# Patient Record
Sex: MALE | Race: WHITE | Employment: OTHER | URBAN - METROPOLITAN AREA
[De-identification: names, ages, dates, MRNs, and addresses within clinical notes are randomized per-mention and may not be internally consistent; named-entity substitution may affect disease eponyms.]

---

## 2019-02-22 ENCOUNTER — APPOINTMENT (OUTPATIENT)
Dept: GENERAL RADIOLOGY | Age: 69
End: 2019-02-22
Attending: NURSE PRACTITIONER
Payer: MEDICARE

## 2019-02-22 ENCOUNTER — HOSPITAL ENCOUNTER (EMERGENCY)
Age: 69
Discharge: HOME | End: 2019-02-22
Attending: EMERGENCY MEDICINE
Payer: MEDICARE

## 2019-02-22 ENCOUNTER — APPOINTMENT (OUTPATIENT)
Dept: CT IMAGING | Age: 69
End: 2019-02-22
Attending: NURSE PRACTITIONER
Payer: MEDICARE

## 2019-02-22 VITALS
RESPIRATION RATE: 18 BRPM | DIASTOLIC BLOOD PRESSURE: 89 MMHG | WEIGHT: 200 LBS | HEART RATE: 86 BPM | SYSTOLIC BLOOD PRESSURE: 174 MMHG | BODY MASS INDEX: 28.63 KG/M2 | TEMPERATURE: 98.6 F | OXYGEN SATURATION: 100 % | HEIGHT: 70 IN

## 2019-02-22 DIAGNOSIS — N20.0 KIDNEY STONE: Primary | ICD-10-CM

## 2019-02-22 DIAGNOSIS — E87.1 HYPONATREMIA: ICD-10-CM

## 2019-02-22 DIAGNOSIS — R93.89 ABNORMAL CT SCAN: ICD-10-CM

## 2019-02-22 DIAGNOSIS — S32.020A CLOSED COMPRESSION FRACTURE OF SECOND LUMBAR VERTEBRA, INITIAL ENCOUNTER: ICD-10-CM

## 2019-02-22 LAB
ALBUMIN SERPL-MCNC: 4.1 G/DL (ref 3.4–5)
ALBUMIN/GLOB SERPL: 1.1 {RATIO} (ref 0.8–1.7)
ALP SERPL-CCNC: 67 U/L (ref 45–117)
ALT SERPL-CCNC: 33 U/L (ref 16–61)
ANION GAP SERPL CALC-SCNC: 7 MMOL/L (ref 3–18)
APPEARANCE UR: CLEAR
AST SERPL-CCNC: 25 U/L (ref 15–37)
BACTERIA URNS QL MICRO: NEGATIVE /HPF
BASOPHILS # BLD: 0 K/UL (ref 0–0.1)
BASOPHILS NFR BLD: 0 % (ref 0–2)
BILIRUB SERPL-MCNC: 1 MG/DL (ref 0.2–1)
BILIRUB UR QL: NEGATIVE
BUN SERPL-MCNC: 21 MG/DL (ref 7–18)
BUN/CREAT SERPL: 18 (ref 12–20)
CALCIUM SERPL-MCNC: 9.1 MG/DL (ref 8.5–10.1)
CHLORIDE SERPL-SCNC: 99 MMOL/L (ref 100–108)
CO2 SERPL-SCNC: 29 MMOL/L (ref 21–32)
COLOR UR: YELLOW
CREAT SERPL-MCNC: 1.16 MG/DL (ref 0.6–1.3)
DIFFERENTIAL METHOD BLD: ABNORMAL
EOSINOPHIL # BLD: 0.1 K/UL (ref 0–0.4)
EOSINOPHIL NFR BLD: 1 % (ref 0–5)
EPITH CASTS URNS QL MICRO: NORMAL /LPF (ref 0–5)
ERYTHROCYTE [DISTWIDTH] IN BLOOD BY AUTOMATED COUNT: 13.7 % (ref 11.6–14.5)
GLOBULIN SER CALC-MCNC: 3.8 G/DL (ref 2–4)
GLUCOSE SERPL-MCNC: 114 MG/DL (ref 74–99)
GLUCOSE UR STRIP.AUTO-MCNC: NEGATIVE MG/DL
HCT VFR BLD AUTO: 45.4 % (ref 36–48)
HGB BLD-MCNC: 15.5 G/DL (ref 13–16)
HGB UR QL STRIP: ABNORMAL
KETONES UR QL STRIP.AUTO: ABNORMAL MG/DL
LEUKOCYTE ESTERASE UR QL STRIP.AUTO: NEGATIVE
LIPASE SERPL-CCNC: 196 U/L (ref 73–393)
LYMPHOCYTES # BLD: 1.8 K/UL (ref 0.9–3.6)
LYMPHOCYTES NFR BLD: 16 % (ref 21–52)
MCH RBC QN AUTO: 33.5 PG (ref 24–34)
MCHC RBC AUTO-ENTMCNC: 34.1 G/DL (ref 31–37)
MCV RBC AUTO: 98.3 FL (ref 74–97)
MONOCYTES # BLD: 1.1 K/UL (ref 0.05–1.2)
MONOCYTES NFR BLD: 10 % (ref 3–10)
NEUTS SEG # BLD: 8 K/UL (ref 1.8–8)
NEUTS SEG NFR BLD: 73 % (ref 40–73)
NITRITE UR QL STRIP.AUTO: NEGATIVE
PH UR STRIP: 6 [PH] (ref 5–8)
PLATELET # BLD AUTO: 164 K/UL (ref 135–420)
PMV BLD AUTO: 10.3 FL (ref 9.2–11.8)
POTASSIUM SERPL-SCNC: 3.8 MMOL/L (ref 3.5–5.5)
PROT SERPL-MCNC: 7.9 G/DL (ref 6.4–8.2)
PROT UR STRIP-MCNC: NEGATIVE MG/DL
RBC # BLD AUTO: 4.62 M/UL (ref 4.7–5.5)
RBC #/AREA URNS HPF: NORMAL /HPF (ref 0–5)
SODIUM SERPL-SCNC: 135 MMOL/L (ref 136–145)
SP GR UR REFRACTOMETRY: 1.02 (ref 1–1.03)
UROBILINOGEN UR QL STRIP.AUTO: 0.2 EU/DL (ref 0.2–1)
WBC # BLD AUTO: 11 K/UL (ref 4.6–13.2)
WBC URNS QL MICRO: NORMAL /HPF (ref 0–5)

## 2019-02-22 PROCEDURE — 81001 URINALYSIS AUTO W/SCOPE: CPT

## 2019-02-22 PROCEDURE — 85025 COMPLETE CBC W/AUTO DIFF WBC: CPT

## 2019-02-22 PROCEDURE — 96376 TX/PRO/DX INJ SAME DRUG ADON: CPT

## 2019-02-22 PROCEDURE — 99284 EMERGENCY DEPT VISIT MOD MDM: CPT

## 2019-02-22 PROCEDURE — 80053 COMPREHEN METABOLIC PANEL: CPT

## 2019-02-22 PROCEDURE — 74011250636 HC RX REV CODE- 250/636: Performed by: NURSE PRACTITIONER

## 2019-02-22 PROCEDURE — 74018 RADEX ABDOMEN 1 VIEW: CPT

## 2019-02-22 PROCEDURE — 87086 URINE CULTURE/COLONY COUNT: CPT

## 2019-02-22 PROCEDURE — 83690 ASSAY OF LIPASE: CPT

## 2019-02-22 PROCEDURE — 96361 HYDRATE IV INFUSION ADD-ON: CPT

## 2019-02-22 PROCEDURE — 74176 CT ABD & PELVIS W/O CONTRAST: CPT

## 2019-02-22 PROCEDURE — 96374 THER/PROPH/DIAG INJ IV PUSH: CPT

## 2019-02-22 RX ORDER — ONDANSETRON 4 MG/1
4 TABLET, FILM COATED ORAL
Qty: 10 TAB | Refills: 0 | Status: SHIPPED | OUTPATIENT
Start: 2019-02-22

## 2019-02-22 RX ORDER — MORPHINE SULFATE 4 MG/ML
4 INJECTION INTRAVENOUS
Status: COMPLETED | OUTPATIENT
Start: 2019-02-22 | End: 2019-02-22

## 2019-02-22 RX ORDER — HYDROCODONE BITARTRATE AND ACETAMINOPHEN 5; 325 MG/1; MG/1
1 TABLET ORAL
Qty: 10 TAB | Refills: 0 | Status: SHIPPED | OUTPATIENT
Start: 2019-02-22

## 2019-02-22 RX ORDER — MORPHINE SULFATE 2 MG/ML
4 INJECTION, SOLUTION INTRAMUSCULAR; INTRAVENOUS
Status: COMPLETED | OUTPATIENT
Start: 2019-02-22 | End: 2019-02-22

## 2019-02-22 RX ORDER — SODIUM CHLORIDE 9 MG/ML
1000 INJECTION, SOLUTION INTRAVENOUS ONCE
Status: COMPLETED | OUTPATIENT
Start: 2019-02-22 | End: 2019-02-22

## 2019-02-22 RX ORDER — TAMSULOSIN HYDROCHLORIDE 0.4 MG/1
0.4 CAPSULE ORAL DAILY
Qty: 15 CAP | Refills: 0 | Status: SHIPPED | OUTPATIENT
Start: 2019-02-22 | End: 2019-03-09

## 2019-02-22 RX ORDER — NABUMETONE 500 MG/1
TABLET, FILM COATED ORAL 2 TIMES DAILY
COMMUNITY

## 2019-02-22 RX ORDER — METHOTREXATE 25 MG/ML
INJECTION INTRA-ARTERIAL; INTRAMUSCULAR; INTRATHECAL; INTRAVENOUS ONCE
COMMUNITY

## 2019-02-22 RX ADMIN — MORPHINE SULFATE 4 MG: 4 INJECTION INTRAVENOUS at 22:30

## 2019-02-22 RX ADMIN — SODIUM CHLORIDE 1000 ML: 900 INJECTION, SOLUTION INTRAVENOUS at 19:08

## 2019-02-22 RX ADMIN — MORPHINE SULFATE 4 MG: 2 INJECTION, SOLUTION INTRAMUSCULAR; INTRAVENOUS at 19:13

## 2019-02-22 NOTE — ED PROVIDER NOTES
EMERGENCY DEPARTMENT HISTORY AND PHYSICAL EXAM 
 
Date: 2/22/2019 Patient Name: Noemy Lyman History of Presenting Illness Chief Complaint Patient presents with  Flank Pain History Provided By: Patient Chief Complaint: Flank pain Duration: 3 Days Timing:  Acute and Waxing and Waning Location: Left flank Modifying Factors: Pt was given Tylenol with Codeine with some relief Associated Symptoms: nausea, chills and constipation Additional History (Context):  
7:02 PM 
Renee Napier III is a 68 y.o. male with PMHX of Kidney Stones, HTN, Celiac Disease, RA, Prostate CA and PSHx Lithotripsy who presents to the emergency department C/O waxing and waning left sided flank pain onset 3 days ago, that resolved 2 days ago and started again yesterday. Pt had a Tylenol with Codeine at home with some relief. Associated sxs include nausea (resolved), chills, constipation. Pt's wife states that they are trying to get a new urologist, and they called Dr. Billy Murphy today who said to come to the ED. Pt denies PMHx heart failure, fever, diarrhea and any other sxs or complaints. PCP: Estephania Muñoz, DO 
 
Current Outpatient Medications Medication Sig Dispense Refill  methotrexate, PF, 25 mg/mL injection once.  nabumetone (RELAFEN) 500 mg tablet Take  by mouth two (2) times a day.  golimumab (SIMPONI ARIA) 12.5 mg/mL soln solution by IntraVENous route.  tamsulosin (FLOMAX) 0.4 mg capsule Take 1 Cap by mouth daily for 15 days. 15 Cap 0  
 HYDROcodone-acetaminophen (NORCO) 5-325 mg per tablet Take 1 Tab by mouth every four (4) hours as needed for Pain. Max Daily Amount: 6 Tabs. 10 Tab 0  
 ondansetron hcl (ZOFRAN) 4 mg tablet Take 1 Tab by mouth every eight (8) hours as needed for Nausea. 10 Tab 0  
 OTHER 1 Bag by IntraVENous route every two (2) months. Indications: Symponi aria infusion rheumatoid arthritis  OTHER Take 10 mg by mouth daily. Indications: leslunomide to augment the symponi  cholecalciferol, vitamin D3, 2,000 unit tab Take 1 Tab by mouth two (2) times a day.  OTHER Take 100 mg by mouth two (2) times a day. Indications: tumeric  omega-3 fatty acids-vitamin e 1,000 mg cap Take 1 Cap by mouth three (3) times daily. Past History Past Medical History: 
Past Medical History:  
Diagnosis Date  Celiac disease  Hypertension  Kidney stones  Prostate CA ([facility] [address]  RA (rheumatoid arthritis) ([facility] [address] Past Surgical History: 
Past Surgical History:  
Procedure Laterality Date  HX APPENDECTOMY  HX CHOLECYSTECTOMY  HX LITHOTRIPSY  HX PROSTATECTOMY  HX TONSILLECTOMY Family History: 
History reviewed. No pertinent family history. Social History: 
Social History Tobacco Use  Smoking status: Never Smoker  Smokeless tobacco: Never Used Substance Use Topics  Alcohol use: Yes  Drug use: Not on file Allergies: Allergies Allergen Reactions  Gluten Hives Review of Systems Review of Systems Constitutional: Positive for chills. Negative for fever. Gastrointestinal: Positive for constipation and nausea. Negative for diarrhea and vomiting. Genitourinary: Positive for flank pain (left). All other systems reviewed and are negative. Physical Exam  
 
Vitals:  
 02/22/19 1707 02/22/19 1913 02/22/19 2142 02/22/19 2233 BP:  172/81 159/76 174/89 Pulse: 86 85 83 86 Resp: 20 16 14 18 Temp: 98.6 °F (37 °C) SpO2: 100% 99% 100% 100% Weight: 90.7 kg (200 lb) Height: 5' 10\" (1.778 m) Physical Exam  
Constitutional: He is oriented to person, place, and time. He appears well-developed and well-nourished. Non-toxic, NAD HENT:  
Head: Normocephalic and atraumatic. Eyes: Conjunctivae are normal.  
Neck: Normal range of motion. Neck supple. Cardiovascular: Normal rate and regular rhythm. Pulmonary/Chest: Effort normal and breath sounds normal.  
Abdominal: Soft. Bowel sounds are normal. He exhibits no distension. There is tenderness. There is no rebound. positive left flank TTP, NO CVA TTP, abd TTP Musculoskeletal: Normal range of motion. Neurological: He is alert and oriented to person, place, and time. Skin: Skin is warm and dry. Nursing note and vitals reviewed. Diagnostic Study Results Labs - Recent Results (from the past 12 hour(s)) URINALYSIS W/ RFLX MICROSCOPIC Collection Time: 02/22/19  5:14 PM  
Result Value Ref Range Color YELLOW Appearance CLEAR Specific gravity 1.017 1.005 - 1.030    
 pH (UA) 6.0 5.0 - 8.0 Protein NEGATIVE  NEG mg/dL Glucose NEGATIVE  NEG mg/dL Ketone TRACE (A) NEG mg/dL Bilirubin NEGATIVE  NEG Blood TRACE (A) NEG Urobilinogen 0.2 0.2 - 1.0 EU/dL Nitrites NEGATIVE  NEG Leukocyte Esterase NEGATIVE  NEG    
URINE MICROSCOPIC ONLY Collection Time: 02/22/19  5:14 PM  
Result Value Ref Range WBC 0 to 1 0 - 5 /hpf  
 RBC 0 to 3 0 - 5 /hpf Epithelial cells FEW 0 - 5 /lpf Bacteria NEGATIVE  NEG /hpf  
CBC WITH AUTOMATED DIFF Collection Time: 02/22/19  6:58 PM  
Result Value Ref Range WBC 11.0 4.6 - 13.2 K/uL  
 RBC 4.62 (L) 4.70 - 5.50 M/uL  
 HGB 15.5 13.0 - 16.0 g/dL HCT 45.4 36.0 - 48.0 % MCV 98.3 (H) 74.0 - 97.0 FL  
 MCH 33.5 24.0 - 34.0 PG  
 MCHC 34.1 31.0 - 37.0 g/dL  
 RDW 13.7 11.6 - 14.5 % PLATELET 366 008 - 036 K/uL MPV 10.3 9.2 - 11.8 FL  
 NEUTROPHILS 73 40 - 73 % LYMPHOCYTES 16 (L) 21 - 52 % MONOCYTES 10 3 - 10 % EOSINOPHILS 1 0 - 5 % BASOPHILS 0 0 - 2 %  
 ABS. NEUTROPHILS 8.0 1.8 - 8.0 K/UL  
 ABS. LYMPHOCYTES 1.8 0.9 - 3.6 K/UL  
 ABS. MONOCYTES 1.1 0.05 - 1.2 K/UL  
 ABS. EOSINOPHILS 0.1 0.0 - 0.4 K/UL  
 ABS. BASOPHILS 0.0 0.0 - 0.1 K/UL  
 DF AUTOMATED METABOLIC PANEL, COMPREHENSIVE  Collection Time: 02/22/19  6:58 PM  
 Result Value Ref Range Sodium 135 (L) 136 - 145 mmol/L Potassium 3.8 3.5 - 5.5 mmol/L Chloride 99 (L) 100 - 108 mmol/L  
 CO2 29 21 - 32 mmol/L Anion gap 7 3.0 - 18 mmol/L Glucose 114 (H) 74 - 99 mg/dL BUN 21 (H) 7.0 - 18 MG/DL Creatinine 1.16 0.6 - 1.3 MG/DL  
 BUN/Creatinine ratio 18 12 - 20 GFR est AA >60 >60 ml/min/1.73m2 GFR est non-AA >60 >60 ml/min/1.73m2 Calcium 9.1 8.5 - 10.1 MG/DL Bilirubin, total 1.0 0.2 - 1.0 MG/DL  
 ALT (SGPT) 33 16 - 61 U/L  
 AST (SGOT) 25 15 - 37 U/L Alk. phosphatase 67 45 - 117 U/L Protein, total 7.9 6.4 - 8.2 g/dL Albumin 4.1 3.4 - 5.0 g/dL Globulin 3.8 2.0 - 4.0 g/dL A-G Ratio 1.1 0.8 - 1.7 LIPASE Collection Time: 02/22/19  6:58 PM  
Result Value Ref Range Lipase 196 73 - 393 U/L Radiologic Studies -  
10:58 PM 
RADIOLOGY FINDINGS Abd KUB X-ray shows no acute process and no kidney stone seen Pending review by Radiologist 
Recorded by Fabien Perales ED Scribe, as dictated by Cassius Rubin [facility] 
 
CT ABD PELV WO CONT Final Result IMPRESSION:  
  
Mild left hydronephrosis and hydroureter secondary to a 4 mm obstructing  
calculus in the left distal ureter. There are other nonobstructing renal calculi bilaterally. Prostatectomy Abnormal appearance to the L2 vertebrae concerning for metastasis bone scan  
advised for further evaluation. XR ABD (KUB)    (Results Pending) CT Results  (Last 48 hours) 02/22/19 2100  CT ABD PELV WO CONT Final result Impression:  IMPRESSION:  
   
Mild left hydronephrosis and hydroureter secondary to a 4 mm obstructing  
calculus in the left distal ureter. There are other nonobstructing renal calculi bilaterally. Prostatectomy Abnormal appearance to the L2 vertebrae concerning for metastasis bone scan  
advised for further evaluation. Narrative:  EXAM: CT of the abdomen and pelvis INDICATION: Back and flank pain COMPARISON: None. TECHNIQUE: Axial CT imaging of the abdomen and pelvis was performed without  
intravenous contrast. Multiplanar reformats were generated. One or more dose  
reduction techniques were used on this CT: automated exposure control,  
adjustment of the mAs and/or kVp according to patient size, and iterative  
reconstruction techniques. The specific techniques used on this CT exam have  
been documented in the patient's electronic medical record. Digital imaging and communications in medicine (DICOM) format image data are  
available to nonaffiliated external healthcare facilities or entities on a  
secure, media free, reciprocally searchable basis with patient authorization for  
at least a 12 month period after this study. _______________ FINDINGS:  
   
LOWER CHEST: Unremarkable. LIVER, BILIARY: Liver is normal. No biliary dilation. Cholecystectomy PANCREAS: Normal.  
   
SPLEEN: Normal.  
   
ADRENALS: Normal.  
   
KIDNEYS: Right kidney: There is a 4 mm calculus in the midpole the right kidney  
and 2 to 3 mm calculus the lower pole the right kidney. There is no  
hydronephrosis. No ureteral stone seen on the right. Left kidney demonstrates perinephric stranding with a 3 mm calculus in the  
midpole the left kidney. There is mild left hydronephrosis and left hydroureter  
with periureteral stranding secondary to an obstructing calculus in the left  
distal ureter seen on image 82 measuring 4 mm. No other ureteral stone seen. VASCULATURE: Mild calcific atherosclerosis present. LYMPH NODES: No enlarged lymph nodes. GASTROINTESTINAL TRACT: No bowel dilation or wall thickening. There is colonic  
diverticulosis without diverticulitis. Appendix is normal.  
   
PELVIC ORGANS: Prostatectomy. Urinary bladder is unremarkable. No free fluid. BONES: L2 vertebral body demonstrates a mottled appearance concerning for  
metastasis. Bone scan advised. There is superior endplate compression deformity  
of L2 age indeterminate. There is mild retrolisthesis at L3-L4 and L2-L3 likely  
degenerative. Multilevel facet arthropathy present. OTHER: None.  
   
_______________ CXR Results  (Last 48 hours) None Medications given in the ED- Medications  
0.9% sodium chloride infusion 1,000 mL (0 mL IntraVENous IV Completed 2/22/19 2027) morphine injection 4 mg (4 mg IntraVENous Given 2/22/19 1913) morphine injection 4 mg (4 mg IntraVENous Given 2/22/19 2230) Medical Decision Making I am the first provider for this patient. I reviewed the vital signs, available nursing notes, past medical history, past surgical history, family history and social history. Vital Signs-Reviewed the patient's vital signs. Pulse Oximetry Analysis - 100% on Room Air Records Reviewed: Nursing Notes and Old Medical Records Provider Notes (Medical Decision Making):  
 
Procedures: 
Procedures ED Course:  
7:02 PM  
Initial assessment performed. The patients presenting problems have been discussed, and they are in agreement with the care plan formulated and outlined with them. I have encouraged them to ask questions as they arise throughout their visit. 10:16 PM Discussed patient's history, exam, and available diagnostics results with Magdy Moore, who recommends getting a KUB, giving urine strainer to follow up with outpatient urology. 10:18 PM 
Updated pt and wife on all results, including L2 deformity on CT scan concerning for metastasis. Will give oncology follow up. They understand reasons to return and are agreeable to tx plan. Discussion: Pt presents with left flank pain, significant hx of kidney stones with lithotripsy. Labs show no signs of infection.  Urine shows no labs of infection but was sent for culture. His pain was controlled. CT shows 4 mm obstructive kidney stone. Discussed case with urology who recommends Flomax, pain meds and outpatient follow up. Pt and wife were notified of concerning CT findings for metastasis and need for bone scan outpatient. They were given PCP, ortho, oncology, and urology for follow up. Understand reasons to return and are agreeable to tx plan. Diagnosis and Disposition DISCHARGE NOTE: 
10:34 PM 
Zara Hudson III's  results have been reviewed with him. He has been counseled regarding his diagnosis, treatment, and plan. He verbally conveys understanding and agreement of the signs, symptoms, diagnosis, treatment and prognosis and additionally agrees to follow up as discussed. He also agrees with the care-plan and conveys that all of his questions have been answered. I have also provided discharge instructions for him that include: educational information regarding their diagnosis and treatment, and list of reasons why they would want to return to the ED prior to their follow-up appointment, should his condition change. He has been provided with education for proper emergency department utilization. CLINICAL IMPRESSION: 
 
1. Kidney stone 2. Hyponatremia 3. Abnormal CT scan 4. Closed compression fracture of second lumbar vertebra, initial encounter ([facility] [address] PLAN: 
1. D/C Home 2. Discharge Medication List as of 2/22/2019 10:34 PM  
  
START taking these medications Details  
tamsulosin (FLOMAX) 0.4 mg capsule Take 1 Cap by mouth daily for 15 days. , Print, Disp-15 Cap, R-0  
  
HYDROcodone-acetaminophen (NORCO) 5-325 mg per tablet Take 1 Tab by mouth every four (4) hours as needed for Pain. Max Daily Amount: 6 Tabs., Print, Disp-10 Tab, R-0  
  
ondansetron hcl (ZOFRAN) 4 mg tablet Take 1 Tab by mouth every eight (8) hours as needed for Nausea. , Print, Disp-10 Tab, R-0  
  
  
 CONTINUE these medications which have NOT CHANGED Details  
methotrexate, PF, 25 mg/mL injection once., Historical Med  
  
nabumetone (RELAFEN) 500 mg tablet Take  by mouth two (2) times a day., Historical Med  
  
golimumab (SIMPONI ARIA) 12.5 mg/mL soln solution by IntraVENous route., Historical Med  
  
!! OTHER 1 Bag by IntraVENous route every two (2) months. Indications: Symponi aria infusion rheumatoid arthritis, Historical Med  
  
!! OTHER Take 10 mg by mouth daily. Indications: leslunomide to augment the symponi, Historical Med  
  
cholecalciferol, vitamin D3, 2,000 unit tab Take 1 Tab by mouth two (2) times a day., Historical Med  
  
!! OTHER Take 100 mg by mouth two (2) times a day. Indications: tumeric, Historical Med  
  
omega-3 fatty acids-vitamin e 1,000 mg cap Take 1 Cap by mouth three (3) times daily. , Historical Med  
  
 !! - Potential duplicate medications found. Please discuss with provider. 3.  
Follow-up Information Follow up With Specialties Details Why Contact Info Hubert Toro MD Urology Schedule an appointment as soon as possible for a visit in 3 days For urology follow up [address] [facility] [address] [facility] 
[phone] Selena December, DO Internal Medicine Schedule an appointment as soon as possible for a visit in 3 days For primary care follow up [address] [facility] [address] [facility] 
[phone] [address]  Schedule an appointment as soon as possible for a visit in 3 days For oncology follow up [address] [facility], [address] [place] [address] 
[phone] Anita Solis MD Orthopedic Surgery Schedule an appointment as soon as possible for a visit in 3 days For orthopedic follow up [address] [facility] [address] [facility] 
[phone] [facility] EMERGENCY DEPT Emergency Medicine Go to As needed, if symptoms worsen [address] [address] 
[phone] _______________________________ Attestations: This note is prepared by Swapna Bowles, acting as Scribe for [facility] FNP-BC. [facility] FNP-BC:  The scribe's documentation has been prepared under my direction and personally reviewed by me in its entirety. I confirm that the note above accurately reflects all work, treatment, procedures, and medical decision making performed by me. 
_______________________________

## 2019-02-23 NOTE — DISCHARGE INSTRUCTIONS
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 They can be as small as grains of sand or, rarely, as large as golf balls. While the stone is traveling through the ureter, which is the tube that carries urine from the kidney to the bladder, you will probably feel pain. The pain may be mild or very severe. You may also have some blood in your urine. As soon as the stone reaches the bladder, any intense pain should go away. If a stone is too large to pass on its own, you may need a medical procedure to help you pass the stone. The doctor has checked you carefully, but problems can develop later. If you notice any problems or new symptoms, get medical treatment right away. Follow-up care is a key part of your treatment and safety. Be sure to make and go to all appointments, and call your doctor if you are having problems. It's also a good idea to know your test results and keep a list of the medicines you take. How can you care for yourself at home? · Drink plenty of fluids, enough so that your urine is light yellow or clear like water. If you have kidney, heart, or liver disease and have to limit fluids, talk with your doctor before you increase the amount of fluids you drink. · Take pain medicines exactly as directed. Call your doctor if you think you are having a problem with your medicine.   ? If the doctor gave you a prescription medicine for pain, take it as prescribed. ? If you are not taking a prescription pain medicine, ask your doctor if you can take an over-the-counter medicine. Read and follow all instructions on the label. · Your doctor may ask you to strain your urine so that you can collect your kidney stone when it passes. You can use a kitchen strainer or a tea strainer to catch the stone. Store it in a plastic bag until you see your doctor again. Preventing future kidney stones  Some changes in your diet may help prevent kidney stones. Depending on the cause of your stones, your doctor may recommend that you:  · Drink plenty of fluids, enough so that your urine is light yellow or clear like water. If you have kidney, heart, or liver disease and have to limit fluids, talk with your doctor before you increase the amount of fluids you drink. · Limit coffee, tea, and alcohol. Also avoid grapefruit juice. · Do not take more than the recommended daily dose of vitamins C and D.  · Avoid antacids such as Gaviscon, Maalox, Mylanta, or Tums. · Limit the amount of salt (sodium) in your diet. · Eat a balanced diet that is not too high in protein. · Limit foods that are high in a substance called oxalate, which can cause kidney stones. These foods include dark green vegetables, rhubarb, chocolate, wheat bran, nuts, cranberries, and beans. When should you call for help? Call your doctor now or seek immediate medical care if:    · You cannot keep down fluids.     · Your pain gets worse.     · You have a fever or chills.     · You have new or worse pain in your back just below your rib cage (the flank area).     · You have new or more blood in your urine.    Watch closely for changes in your health, and be sure to contact your doctor if:    · You do not get better as expected. Where can you learn more? Go to http://kalpesh-atiya.info/. Enter F364 in the search box to learn more about \"Kidney Stone: Care Instructions. \"  Current as of: March 14, 2018  Content Version: 11.9  © 0109-7669 [employer]. Care instructions adapted under license by [employer] (which disclaims liability or warranty for this information). If you have questions about a medical condition or this instruction, always ask your healthcare professional. [address] any warranty or liability for your use of this information. Hyponatremia: Care Instructions  Your Care Instructions    Hyponatremia (say \"[id number]-[id number]-TREE-pedro-uh\") means that you don't have enough sodium in your blood. It can cause nausea, vomiting, and headaches. Or you may not feel hungry. In serious cases, it can cause seizures, a coma, or even death. Hyponatremia is not a disease. It is a problem caused by something else, such as medicines or exercising for a long time in hot weather. You can get hyponatremia if you lose a lot of fluids and then you drink a lot of water or other liquids that don't have much sodium. You can also get it if you have kidney, liver, heart, or other health problems. Treatment is focused on getting your sodium levels back to normal.  Follow-up care is a key part of your treatment and safety. Be sure to make and go to all appointments, and call your doctor if you are having problems. It's also a good idea to know your test results and keep a list of the medicines you take. How can you care for yourself at home? · If your doctor recommends it, drink fluids that have sodium. Sports drinks are a good choice. Or you can eat salty foods. · If your doctor recommends it, limit the amount of water you drink. And limit fluids that are mostly water. These include tea, coffee, and juice. · Take your medicines exactly as prescribed. Call your doctor if you have any problems with your medicine. · Get your sodium levels tested when your doctor tells you to. When should you call for help? Call 911 anytime you think you may need emergency care. For example, call if:    · You have a seizure.     · You passed out (lost consciousness).    Call your doctor now or seek immediate medical care if:    · You are confused or it is hard to focus.     · You have little or no appetite.     · You feel sick to your stomach or you vomit.     · You have a headache.     · You have mood changes.     · You feel more tired than usual.    Watch closely for changes in your health, and be sure to contact your doctor if:    · You do not get better as expected. Where can you learn more? Go to http://kalpesh-atiya.info/. Enter U585 in the search box to learn more about \"Hyponatremia: Care Instructions. \"  Current as of: June 25, 2018  Content Version: 11.9  © 1527-8058 [employer]. Care instructions adapted under license by [employer] (which disclaims liability or warranty for this information). If you have questions about a medical condition or this instruction, always ask your healthcare professional. [place] [address] any warranty or liability for your use of this information. Compression Fracture of the Spine: Care Instructions  Your Care Instructions    A compression fracture happens when the front part of a spinal bone breaks and collapses. A fall or other accident can cause it. A minor injury or moving the wrong way can cause a break if you have thin or brittle bones (osteoporosis). These types of breaks will heal in 8 to 10 weeks. You will need rest and pain medicines. Your doctor may recommend physical therapy. Some doctors recommend that certain people with compression fractures wear braces. Your doctor also may treat thin or brittle bones. You may need surgery if you have lasting pain or if the bone presses on the spinal cord or nerves. You heal best when you take good care of yourself. Eat a variety of healthy foods, and don't smoke. Follow-up care is a key part of your treatment and safety.  Be sure to make and go to all appointments, and call your doctor if you are having problems. It's also a good idea to know your test results and keep a list of the medicines you take. How can you care for yourself at home? · Be safe with medicines. Read and follow all instructions on the label. ? If the doctor gave you a prescription medicine for pain, take it as prescribed. ? If you are not taking a prescription pain medicine, ask your doctor if you can take an over-the-counter medicine. · Talk to your doctor about how to make your bones stronger. You may need medicines or a change in what you eat. · Be active only as directed by your doctor. When should you call for help? Call 911 anytime you think you may need emergency care. For example, call if:    · You are unable to move an arm or a leg at all.   [facility] your doctor now or seek immediate medical care if:    · You have new or worse symptoms in your arms, legs, belly, or buttocks. Symptoms may include:  ? Numbness or tingling. ? Weakness. ? Pain.     · You lose bladder or bowel control.     · You have belly pain, bloating, vomiting, or nausea.    Watch closely for changes in your health, and be sure to contact your doctor if:    · You do not get better as expected. Where can you learn more? Go to http://kalpesh-atiya.info/. Enter P445 in the search box to learn more about \"Compression Fracture of the Spine: Care Instructions. \"  Current as of: September 20, 2018  Content Version: 11.9  © 1977-0965 [employer]. Care instructions adapted under license by [employer] (which disclaims liability or warranty for this information). If you have questions about a medical condition or this instruction, always ask your healthcare professional. [address] any warranty or liability for your use of this information.

## 2019-02-25 LAB
BACTERIA SPEC CULT: NORMAL
SERVICE CMNT-IMP: NORMAL

## 2019-03-14 ENCOUNTER — HOSPITAL ENCOUNTER (OUTPATIENT)
Dept: NUCLEAR MEDICINE | Age: 69
Discharge: HOME | End: 2019-03-14
Attending: INTERNAL MEDICINE
Payer: MEDICARE

## 2019-03-14 ENCOUNTER — HOSPITAL ENCOUNTER (OUTPATIENT)
Dept: MRI IMAGING | Age: 69
Discharge: HOME | End: 2019-03-14
Attending: INTERNAL MEDICINE
Payer: MEDICARE

## 2019-03-14 DIAGNOSIS — C61 PROSTATE CA (HCC): ICD-10-CM

## 2019-03-14 PROCEDURE — 78306 BONE IMAGING WHOLE BODY: CPT

## 2019-03-14 PROCEDURE — 72148 MRI LUMBAR SPINE W/O DYE: CPT
